# Patient Record
Sex: FEMALE | Race: WHITE
[De-identification: names, ages, dates, MRNs, and addresses within clinical notes are randomized per-mention and may not be internally consistent; named-entity substitution may affect disease eponyms.]

---

## 2022-08-26 ENCOUNTER — HOSPITAL ENCOUNTER (OUTPATIENT)
Age: 32
End: 2022-08-26
Payer: OTHER GOVERNMENT

## 2022-08-26 DIAGNOSIS — N63.21: Primary | ICD-10-CM

## 2022-08-26 PROCEDURE — 76642 ULTRASOUND BREAST LIMITED: CPT

## 2022-11-16 ENCOUNTER — HOSPITAL ENCOUNTER (OUTPATIENT)
Age: 32
End: 2022-11-16
Payer: COMMERCIAL

## 2022-11-16 DIAGNOSIS — R06.02: Primary | ICD-10-CM

## 2022-11-16 DIAGNOSIS — J98.8: ICD-10-CM

## 2022-11-16 DIAGNOSIS — Z20.822: Primary | ICD-10-CM

## 2022-11-16 DIAGNOSIS — Z20.822: ICD-10-CM

## 2022-11-16 PROCEDURE — 94010 BREATHING CAPACITY TEST: CPT

## 2022-11-16 PROCEDURE — 87635 SARS-COV-2 COVID-19 AMP PRB: CPT

## 2023-01-18 ENCOUNTER — HOSPITAL ENCOUNTER (OUTPATIENT)
Age: 33
End: 2023-01-18
Payer: OTHER GOVERNMENT

## 2023-01-18 DIAGNOSIS — O36.80X0: Primary | ICD-10-CM

## 2023-01-18 DIAGNOSIS — Z3A.01: ICD-10-CM

## 2023-01-18 PROCEDURE — 76801 OB US < 14 WKS SINGLE FETUS: CPT

## 2023-02-02 ENCOUNTER — HOSPITAL ENCOUNTER (OUTPATIENT)
Dept: HOSPITAL 76 - DI | Age: 33
Discharge: HOME | End: 2023-02-02
Attending: ADVANCED PRACTICE MIDWIFE
Payer: COMMERCIAL

## 2023-02-02 DIAGNOSIS — Z3A.00: ICD-10-CM

## 2023-02-02 DIAGNOSIS — O36.80X0: Primary | ICD-10-CM

## 2023-02-02 DIAGNOSIS — N83.291: ICD-10-CM

## 2023-02-02 DIAGNOSIS — O34.80: ICD-10-CM

## 2023-02-02 NOTE — ULTRASOUND REPORT
PROCEDURE:  OB First Trimester

 

INDICATIONS:  INCONCLUSIVE VIABILITY

 

OUTSIDE/PRIOR DATING DATA:  

Last menstrual period (LMP): 2022.  

LMP-based estimated date of delivery (KYLEE): 2023.  

First dating scan (date and location): 2023.  

Estimated date of delivery (KYLEE) from first dating scan: 2023.  

 

 

TECHNIQUE:  

Real-time scanning was performed of the fetus and maternal pelvic organs, with image documentation.  


 

COMPARISON:  [facility] 2023

 

FINDINGS:     

Anteverted uterus contains an oblong, compressed intrauterine gestational sac. There are cystic bingham
es throughout the decidual. There is subjacent heterogeneity in the myometrium. The cervix appears cl
osed.

 

Transvaginal imaging demonstrates possible fetal pole with an average crown-rump length of 0.48 cm. T
here is an adjacent irregular yolk sac. No detectable cardiac activity and the fetal pole. The cervix
 is closed.

 

The right ovary contains a thick-walled cyst measuring 2.2 cm, but there is no peripheral vascularity
. There are several other small ovarian follicles present in each ovary. Small amount of pelvic fluid
 seen in the cul-de-sac.

 

IMPRESSION:  

 

1. The appearance of a gestational sac and products of conception as significantly changed since the 
prior study and is now consistent with nonviable pregnancy. There is no longer detectable cardiac mot
ion in the fetal pole, yolk sac has become smaller and irregular, and the gestational sac is now abno
rmally oblong. Cystic changes in the decidual likely indicate impending .

2. Right ovarian involuting cyst, likely prior corpus luteum.

3. Preliminary results given by the sonographer to the ordering provider immediately following the st
udy.

 

Reviewed by: Dacia Barbosa MD on 2023 8:45 PM PST

Approved by: Dacia Barbosa MD on 2023 8:45 PM PST

 

 

Station ID:  [id number]

## 2023-02-02 NOTE — ULTRASOUND REPORT
PROCEDURE:  OB First Trimester

 

INDICATIONS:  INCONCLUSIVE VIABILITY

 

OUTSIDE/PRIOR DATING DATA:  

Last menstrual period (LMP): 2022.  

LMP-based estimated date of delivery (KYLEE): 2023.  

First dating scan (date and location): 2023.  

Estimated date of delivery (KYLEE) from first dating scan: 2023.  

 

 

TECHNIQUE:  

Real-time scanning was performed of the fetus and maternal pelvic organs, with image documentation.  


 

COMPARISON:  [facility] 2023

 

FINDINGS:     

Anteverted uterus contains an oblong, compressed intrauterine gestational sac. There are cystic bingham
es throughout the decidual. There is subjacent heterogeneity in the myometrium. The cervix appears cl
osed.

 

Transvaginal imaging demonstrates possible fetal pole with an average crown-rump length of 0.48 cm. T
here is an adjacent irregular yolk sac. No detectable cardiac activity and the fetal pole. The cervix
 is closed.

 

The right ovary contains a thick-walled cyst measuring 2.2 cm, but there is no peripheral vascularity
. There are several other small ovarian follicles present in each ovary. Small amount of pelvic fluid
 seen in the cul-de-sac.

 

IMPRESSION:  

 

1. The appearance of a gestational sac and products of conception as significantly changed since the 
prior study and is now consistent with nonviable pregnancy. There is no longer detectable cardiac mot
ion in the fetal pole, yolk sac has become smaller and irregular, and the gestational sac is now abno
rmally oblong. Cystic changes in the decidual likely indicate impending .

2. Right ovarian involuting cyst, likely prior corpus luteum.

3. Preliminary results given by the sonographer to the ordering provider immediately following the st
udy.

 

Reviewed by: Dacia Barbosa MD on 2023 8:45 PM PST

Approved by: Dacia Barbosa MD on 2023 8:45 PM PST

 

 

Station ID:  [id number]

## 2023-04-06 ENCOUNTER — HOSPITAL ENCOUNTER (OUTPATIENT)
Dept: HOSPITAL 76 - LAB.N | Age: 33
Discharge: HOME | End: 2023-04-06
Attending: ADVANCED PRACTICE MIDWIFE
Payer: COMMERCIAL

## 2023-04-06 DIAGNOSIS — O36.80X0: Primary | ICD-10-CM

## 2023-04-06 PROCEDURE — 36415 COLL VENOUS BLD VENIPUNCTURE: CPT

## 2023-04-06 PROCEDURE — 84702 CHORIONIC GONADOTROPIN TEST: CPT

## 2023-04-09 ENCOUNTER — HOSPITAL ENCOUNTER (OUTPATIENT)
Dept: HOSPITAL 76 - LAB | Age: 33
Discharge: HOME | End: 2023-04-09
Attending: ADVANCED PRACTICE MIDWIFE
Payer: COMMERCIAL

## 2023-04-09 DIAGNOSIS — O26.851: Primary | ICD-10-CM

## 2023-04-09 PROCEDURE — 36415 COLL VENOUS BLD VENIPUNCTURE: CPT

## 2023-04-09 PROCEDURE — 84702 CHORIONIC GONADOTROPIN TEST: CPT

## 2023-04-13 ENCOUNTER — HOSPITAL ENCOUNTER (OUTPATIENT)
Dept: HOSPITAL 76 - LAB.N | Age: 33
Discharge: HOME | End: 2023-04-13
Attending: ADVANCED PRACTICE MIDWIFE
Payer: COMMERCIAL

## 2023-04-13 DIAGNOSIS — O03.80: Primary | ICD-10-CM

## 2023-04-13 LAB
ERYTHROCYTE [DISTWIDTH] IN BLOOD BY AUTOMATED COUNT: 12.5 % (ref 12–15)
HCT VFR BLD AUTO: 38.4 % (ref 37–47)
HGB UR QL STRIP: 12.5 G/DL (ref 12–16)
MCH RBC QN AUTO: 27.8 PG (ref 27–31)
MCHC RBC AUTO-ENTMCNC: 32.6 G/DL (ref 32–36)
MCV RBC AUTO: 85.5 FL (ref 81–99)
NEUTROPHILS # SNV AUTO: 5.9 X10^3/UL (ref 4.8–10.8)
PDW BLD AUTO: 9.8 FL (ref 7.9–10.8)
PLATELET # BLD: 238 10^3/UL (ref 130–450)
RBC MAR: 4.49 10^6/UL (ref 4.2–5.4)

## 2023-04-13 PROCEDURE — 84702 CHORIONIC GONADOTROPIN TEST: CPT

## 2023-04-13 PROCEDURE — 85027 COMPLETE CBC AUTOMATED: CPT

## 2023-04-13 PROCEDURE — 36415 COLL VENOUS BLD VENIPUNCTURE: CPT

## 2023-04-18 ENCOUNTER — HOSPITAL ENCOUNTER (OUTPATIENT)
Dept: HOSPITAL 76 - DI | Age: 33
Discharge: HOME | End: 2023-04-18
Attending: NURSE PRACTITIONER
Payer: COMMERCIAL

## 2023-04-18 DIAGNOSIS — R93.89: ICD-10-CM

## 2023-04-18 DIAGNOSIS — O02.81: Primary | ICD-10-CM

## 2023-04-19 NOTE — ULTRASOUND REPORT
PROCEDURE:  OB First Trimester w/TV

 

INDICATIONS:  INAPPROP CHG QUANTITAV HCG IN EARLY PREGNANCY

 

OUTSIDE/PRIOR DATING DATA:  

Last menstrual period (LMP): 2/14/2023.  

LMP-based estimated date of delivery (KYLEE): 1/21/2023.  

 

TECHNIQUE:  

Real-time scanning was performed of the fetus and maternal pelvic organs, with image documentation.  
Endovaginal scanning was also performed to better visualize the fetus and maternal ovaries.  

 

COMPARISON:  None

 

FINDINGS:  No fetal pole is identified. Right corpus luteum cyst is suspected to be present. Markedly
 thickened heterogeneous endometrium, measuring a thickness of 32 mm, with hyperemia. No definite adn
exal masses.

 

IMPRESSION:  

No definite adnexal masses. Thickened, hyperemic, heterogeneous endometrium in the setting of declini
ng hCG values and recent prior pregnancy failure might represent retained products of conception. No 
viable fetal pole is seen in the endometrium on today's study. An imaging differential consideration 
includes molar pregnancy, though this would be unusual in the setting of the declining hCG.

 

Consider continued imaging, laboratory, and gynecologic follow-up.

 

Reviewed by: Nickolas Feldman MD on 4/19/2023 12:21 AM PDT

Approved by: Nickolas Feldman MD on 4/19/2023 12:21 AM PDT

 

 

Station ID:  [id number]

## 2023-05-02 ENCOUNTER — HOSPITAL ENCOUNTER (OUTPATIENT)
Dept: HOSPITAL 76 - SDS | Age: 33
Discharge: HOME | End: 2023-05-02
Attending: OBSTETRICS & GYNECOLOGY
Payer: COMMERCIAL

## 2023-05-02 VITALS — SYSTOLIC BLOOD PRESSURE: 90 MMHG | DIASTOLIC BLOOD PRESSURE: 57 MMHG

## 2023-05-02 DIAGNOSIS — O03.4: Primary | ICD-10-CM

## 2023-05-02 LAB
BASOPHILS NFR BLD AUTO: 0 10^3/UL (ref 0–0.1)
BASOPHILS NFR BLD AUTO: 0.8 %
EOSINOPHIL # BLD AUTO: 0.1 10^3/UL (ref 0–0.7)
EOSINOPHIL NFR BLD AUTO: 1.9 %
ERYTHROCYTE [DISTWIDTH] IN BLOOD BY AUTOMATED COUNT: 13 % (ref 12–15)
HCG UR QL: POSITIVE
HCT VFR BLD AUTO: 34.9 % (ref 37–47)
HGB UR QL STRIP: 11.4 G/DL (ref 12–16)
LYMPHOCYTES # SPEC AUTO: 2.1 10^3/UL (ref 1.5–3.5)
LYMPHOCYTES NFR BLD AUTO: 43.2 %
MCH RBC QN AUTO: 27.7 PG (ref 27–31)
MCHC RBC AUTO-ENTMCNC: 32.7 G/DL (ref 32–36)
MCV RBC AUTO: 84.7 FL (ref 81–99)
MONOCYTES # BLD AUTO: 0.2 10^3/UL (ref 0–1)
MONOCYTES NFR BLD AUTO: 4.6 %
NEUTROPHILS # BLD AUTO: 2.4 10^3/UL (ref 1.5–6.6)
NEUTROPHILS # SNV AUTO: 4.8 X10^3/UL (ref 4.8–10.8)
NEUTROPHILS NFR BLD AUTO: 49.3 %
NRBC # BLD AUTO: 0 /100WBC
NRBC # BLD AUTO: 0 X10^3/UL
PDW BLD AUTO: 9.9 FL (ref 7.9–10.8)
PLATELET # BLD: 170 10^3/UL (ref 130–450)
RBC MAR: 4.12 10^6/UL (ref 4.2–5.4)

## 2023-05-02 PROCEDURE — 10D17ZZ EXTRACTION OF PRODUCTS OF CONCEPTION, RETAINED, VIA NATURAL OR ARTIFICIAL OPENING: ICD-10-PCS | Performed by: OBSTETRICS & GYNECOLOGY

## 2023-05-02 PROCEDURE — 59812 TREATMENT OF MISCARRIAGE: CPT

## 2023-05-02 PROCEDURE — 85025 COMPLETE CBC W/AUTO DIFF WBC: CPT

## 2023-05-02 PROCEDURE — 81025 URINE PREGNANCY TEST: CPT

## 2023-05-02 PROCEDURE — 86850 RBC ANTIBODY SCREEN: CPT

## 2023-05-02 PROCEDURE — 86901 BLOOD TYPING SEROLOGIC RH(D): CPT

## 2023-05-02 PROCEDURE — 86900 BLOOD TYPING SEROLOGIC ABO: CPT

## 2023-05-02 PROCEDURE — 36415 COLL VENOUS BLD VENIPUNCTURE: CPT

## 2023-05-02 NOTE — ANESTHESIA
Pre-Anesthesia VS, & Labs





- Diagnosis





incomplete 





- Procedure





suction D&C





- NPO


>8 hours





- Pregnancy


Is Patient Pregnant?: Yes (incomplete AB)





- Lab Results


Lab results reviewed: Yes





Home Medications and Allergies


Home Medications: 


Ambulatory Orders





Acetaminophen [Tylenol] 1 tab PO PRN PRN 23 


Lactobacillus Combination No.4 [Probiotic] 1 cap PO DAILY 23 


Multivitamin 1 tab PO DAILY 23 











                                        





Acetaminophen [Tylenol] 1 tab PO PRN PRN 23 


Lactobacillus Combination No.4 [Probiotic] 1 cap PO DAILY 23 


Multivitamin 1 tab PO DAILY 23 








Allergies/Adverse Reactions: 


                                    Allergies











Allergy/AdvReac Type Severity Reaction Status Date / Time


 


amoxicillin Allergy  Unknown Verified 23 13:04


 


Sulfa (Sulfonamide Allergy  Unknown Verified 23 13:04





Antibiotics)     


 


tetracycline Allergy  Unknown Verified 23 13:04














Anes History & Medical History





- Anesthetic History


Anesthesia Complications: reports: No previous complications


Family history of Anesthesia Complications: Denies


Family history of Malignant Hyperthermia: Denies





- Medical History


Cardiovascular: reports: None


Pulmonary: reports: None


Gastrointestinal: reports: None


Urinary: reports: None


Musculoskeletal: reports: None


Endocrine/Autoimmune: reports: None


Skin: reports: None





Exam


General: Alert, Oriented x3, Cooperative


Dental: WNL


Mouth Opening: 3 Fingerbreadth


Neck Mobility: Normal


Mallampati classification: II


Thyromental Distance: 4-6 cm


Respiratory: Lungs clear, Normal breath sounds, No respiratory distress


Cardiovascular: Regular rate


Neurological: Normal speech


Mental/Cognitive Status: Alert/Oriented X3, Normal for patient


Cognitive Status: Within normal limits





Plan


Anesthesia Type: Total IV


Consent for Procedure(s) Verified and Reviewed: Yes


Code Status: Attempt Resuscitation


ASA classification: 2-Mild systemic disease


Is this case an emergency?: No

## 2023-05-02 NOTE — OPERATIVE REPORT
Operative Report





- General


Procedure Date: 23


Planned Procedure: 





Dilation and curettage


Pre-Op Diagnosis: Incomplete spontaneous 


Procedure Performed: 





Dilation and curettage


Post Op Diagnosis: Incomplete spontaneous 





- Procedure Note


Primary Surgeon: Natalya Infante DO


Anesthesia Provider: Buzz Serna CRNA


Anesthesia Technique: Moderate sedation


Pathology: 





Products of conception


Estimated Blood Loss (mL): 400


Indications: 





Incomplete spontaneous 


Findings: 





Moderate amounts of products of conception


Complications: 





None





- Other


Other Information/Narrative: 





Patient transported to OR and adequate sedation achieved. Patient placed in 

dorsal lithotomy position. Prepped and draped in sterile fashion. Speculum 

placed. Cervix sounded 8cm. Dilated. 7mm curved suction curette introduced under

US guidance and uterus cleared of products of conception. Sharp curettage 

removed additional tissue. Suction curette introduced again. Uterus cleared of 

tissue. Bleeding encountered. TXA 1g, misoprostol 800mcg, methergine 0.2mg, and 

hemabate 250mcg given. Bleeding controlled. Suction reintroduced to clear 

remaining blood. Uterus with thin endometrium and no bleeding noted. Counts 

correct x2. Patient transferred to PACU in stable condition.

## 2023-05-02 NOTE — ANESTHESIA POST OP EVALUATION
Anesthesia Post Eval





- Post Anesthesia Eval


Vitals: 





                                Last Vital Signs











Temp  36.6 C   05/02/23 14:25


 


Pulse  65   05/02/23 14:25


 


Resp  16   05/02/23 14:25


 


BP  95/63   05/02/23 14:25


 


Pulse Ox  100   05/02/23 14:25


 


O2 Flow Rate      











CV Function Including HR & BP: Stable


Pain Control: Satisfactory


Nausea & Vomiting: Negative


Mental Status: Baseline


Respiratory Status: Airway Patent


Hydration Status: Satisfactory


Anesthesia Complications: None

## 2023-05-05 ENCOUNTER — HOSPITAL ENCOUNTER (OUTPATIENT)
Dept: HOSPITAL 76 - LAB | Age: 33
Discharge: HOME | End: 2023-05-05
Attending: OBSTETRICS & GYNECOLOGY
Payer: COMMERCIAL

## 2023-05-05 DIAGNOSIS — O03.4: Primary | ICD-10-CM

## 2023-05-05 PROCEDURE — 84702 CHORIONIC GONADOTROPIN TEST: CPT

## 2023-05-05 PROCEDURE — 36415 COLL VENOUS BLD VENIPUNCTURE: CPT

## 2023-06-06 ENCOUNTER — HOSPITAL ENCOUNTER (OUTPATIENT)
Dept: HOSPITAL 76 - DI | Age: 33
Discharge: HOME | End: 2023-06-06
Attending: OBSTETRICS & GYNECOLOGY
Payer: COMMERCIAL

## 2023-06-06 DIAGNOSIS — O03.4: Primary | ICD-10-CM

## 2023-06-07 NOTE — ULTRASOUND REPORT
PROCEDURE:  Pelvic w/Transvaginal

 

INDICATIONS:  INCOMPLETE 

 

TECHNIQUE:  

Real-time scanning was performed of the pelvic organs, with image documentation.  Additional endovagi
nal scanning was necessary due to incomplete visualization of the adnexal and endometrial structures 
by transabdominal scanning.  

 

COMPARISON:  None.

 

FINDINGS:  

 

Uterus:  Uterus is anteverted and normal in size at 7.5 x 5.6 x 4.5 cm.  The myometrium is homogeneou
s.  The endometrium measures 6 mm in combined thickness.  Trace fluid within the endometrial cavity. 
Punctate calcifications in the cervix.

 

Ovaries:  The right ovary measures 4.9 x 2.0 x 2.8 cm, with a calculated ovarian volume of 14 cc.  Th
e left ovary measures 3.5 x 1.9 x 1.7 cm, with a calculated ovarian volume of 5.8 cc.  The ovaries ha
ve a normal sonographic appearance.  Greater than 12 follicles can be seen in each ovary.  No adnexal
 masses are seen. No cystic lesions measuring greater than 3 cm.

 

Other:  No pathologic free abdominal or pelvic fluid.

 

 

IMPRESSION:  

Greater than 12 follicles can be seen in each ovary, which can be seen in the clinical setting of PCO
S.

 

 

 

Reviewed by: Oz Portillo on 2023 2:21 PM PDT

Approved by: Oz Portillo on 2023 2:21 PM PDT

 

 

Station ID:  [id number]